# Patient Record
Sex: MALE | Race: ASIAN | Employment: STUDENT | URBAN - METROPOLITAN AREA
[De-identification: names, ages, dates, MRNs, and addresses within clinical notes are randomized per-mention and may not be internally consistent; named-entity substitution may affect disease eponyms.]

---
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
## 2025-01-31 ENCOUNTER — HOSPITAL ENCOUNTER (EMERGENCY)
Facility: HOSPITAL | Age: 20
Discharge: HOME | End: 2025-01-31
Attending: PEDIATRICS
Payer: COMMERCIAL

## 2025-01-31 ENCOUNTER — APPOINTMENT (OUTPATIENT)
Facility: HOSPITAL | Age: 20
End: 2025-01-31
Payer: COMMERCIAL

## 2025-01-31 VITALS
HEART RATE: 88 BPM | WEIGHT: 219.8 LBS | OXYGEN SATURATION: 98 % | RESPIRATION RATE: 12 BRPM | SYSTOLIC BLOOD PRESSURE: 135 MMHG | DIASTOLIC BLOOD PRESSURE: 63 MMHG | TEMPERATURE: 98.1 F

## 2025-01-31 DIAGNOSIS — S89.91XA INJURY OF RIGHT KNEE, INITIAL ENCOUNTER: Primary | ICD-10-CM

## 2025-01-31 PROCEDURE — 6370000000 HC RX 637 (ALT 250 FOR IP)

## 2025-01-31 PROCEDURE — 99283 EMERGENCY DEPT VISIT LOW MDM: CPT

## 2025-01-31 PROCEDURE — 73562 X-RAY EXAM OF KNEE 3: CPT

## 2025-01-31 RX ORDER — IBUPROFEN 600 MG/1
600 TABLET, FILM COATED ORAL
Status: COMPLETED | OUTPATIENT
Start: 2025-01-31 | End: 2025-01-31

## 2025-01-31 RX ADMIN — IBUPROFEN 600 MG: 600 TABLET, FILM COATED ORAL at 16:56

## 2025-01-31 NOTE — ED TRIAGE NOTES
Pt reports right knee injury while playing soccer yesterday and now feels like he cannot \"stand straight\".

## 2025-01-31 NOTE — ED PROVIDER NOTES
patient has difficulty with leg extension, recommend outpatient   orthopedic follow-up.      Electronically signed by Vinayak Ahuja           LABS:  Labs Reviewed - No data to display    All other labs were within normal range or not returned as of this dictation.    EMERGENCY DEPARTMENT COURSE and DIFFERENTIAL DIAGNOSIS/MDM:   Vitals:    Vitals:    01/31/25 1630 01/31/25 1644   BP:  135/63   Pulse:  88   Resp:  12   Temp:  98.1 °F (36.7 °C)   SpO2:  98%   Weight: 99.7 kg (219 lb 12.8 oz)            Medical Decision Making  Patient presents with right knee pain after injury.  On exam patient has mild swelling of the right knee.  Patient does appear to have some laxity of the ACL.  X-ray imaging showed no fracture.  Patient does have a suprapatellar soft tissue swelling which may include a joint effusion.  Given history, exam and workup patient likely has ligamentous injury. I have low suspicion for fracture, dislocation, septic arthritis, gout flare, new autoimmune arthropathy, or gonococcal arthropathy.  Patient placed in knee immobilizer and instructed on use of crutches.  He will follow-up closely with orthopedics.  Strict return precautions given for new or worsening symptoms.  He is stable and well-appearing at time of discharge home.     Discussed my clinical impression(s), any labs and/or radiology results with the patient/ guardian. I answered any questions and addressed any concerns. Discussed the importance of following up with their primary care physician and/or specialist(s). Discussed signs or symptoms that would warrant return back to the ER for further evaluation. The patient/ guardian is agreeable with discharge.          Amount and/or Complexity of Data Reviewed  Radiology: ordered.    Risk  Prescription drug management.            REASSESSMENT            CONSULTS:  None    PROCEDURES:  Unless otherwise noted below, none     Procedures      FINAL IMPRESSION      1. Injury of right knee, initial  encounter          DISPOSITION/PLAN   DISPOSITION Decision To Discharge 01/31/2025 05:53:24 PM      PATIENT REFERRED TO:  Na Walker MD  [address] [address] [phone]    Schedule an appointment as soon as possible for a visit       [facility]  [address] [place] [id number] [phone]  Go to   If symptoms worsen      DISCHARGE MEDICATIONS:  Discharge Medication List as of 1/31/2025  5:53 PM            (Please note that portions of this note were completed with a voice recognition program.  Efforts were made to edit the dictations but occasionally words are mis-transcribed.)    Fannie Sewell PA-C (electronically signed)  Emergency Attending Physician / Physician Assistant / Nurse Practitioner             Fannie Sewell PA-C  01/31/25 2021

## 2025-01-31 NOTE — DISCHARGE INSTRUCTIONS
You were seen today in the emergency department for knee pain.  X-ray imaging did not show a bony abnormality however given the fluid on the x-ray and my exam I do have some concern for a ligamentous injury.  You can place in a knee immobilizer and on crutches.  You should remain in these until you see orthopedics.  Call the number above to schedule an appointment.  He can take ibuprofen or Tylenol as needed for pain.

## 2025-01-31 NOTE — ED NOTES
Patient discharged home. Patient acting age appropriately, respirations regular and unlabored, cap refill less than two seconds. Skin pink, dry and warm. Lungs clear bilaterally. Patient has tolerated PO in the ED. No further complaints at this time. Patient verbalized understanding of discharge paperwork and has no further questions at this time.    Education provided about continuation of care, follow up care w/ ortho as soon as possible and medication administration. Patient able to provided teach back about discharge instructions.   Education provided on infection prevention and control including proper hand hygiene and isolating while sick.